# Patient Record
Sex: FEMALE | NOT HISPANIC OR LATINO | URBAN - METROPOLITAN AREA
[De-identification: names, ages, dates, MRNs, and addresses within clinical notes are randomized per-mention and may not be internally consistent; named-entity substitution may affect disease eponyms.]

---

## 2023-02-26 PROBLEM — I48.0 PAROXYSMAL ATRIAL FIBRILLATION (MULTI): Status: ACTIVE | Noted: 2022-02-24

## 2023-02-26 PROBLEM — K59.00 CONSTIPATION: Status: ACTIVE | Noted: 2023-02-26

## 2023-02-26 PROBLEM — E78.00 ELEVATED LOW DENSITY LIPOPROTEIN (LDL) CHOLESTEROL LEVEL: Status: ACTIVE | Noted: 2023-02-26

## 2023-02-26 PROBLEM — I10 BENIGN ESSENTIAL HYPERTENSION: Status: ACTIVE | Noted: 2023-02-26

## 2023-02-26 PROBLEM — S33.5XXA LUMBAR SPRAIN: Status: ACTIVE | Noted: 2023-02-26

## 2023-02-26 PROBLEM — J44.9 CHRONIC OBSTRUCTIVE PULMONARY DISEASE (MULTI): Status: ACTIVE | Noted: 2023-02-26

## 2023-02-26 PROBLEM — R00.2 HEART PALPITATIONS: Status: ACTIVE | Noted: 2023-02-26

## 2023-02-26 PROBLEM — R42 DIZZINESS: Status: ACTIVE | Noted: 2023-02-26

## 2023-02-26 PROBLEM — R35.0 URINARY FREQUENCY: Status: ACTIVE | Noted: 2023-02-26

## 2023-02-26 PROBLEM — F17.210 SMOKING GREATER THAN 20 PACK YEARS: Status: ACTIVE | Noted: 2023-02-26

## 2023-02-26 PROBLEM — G45.9 TIA (TRANSIENT ISCHEMIC ATTACK): Status: ACTIVE | Noted: 2023-02-26

## 2023-02-26 PROBLEM — Z86.010 HISTORY OF COLON POLYPS: Status: ACTIVE | Noted: 2023-02-26

## 2023-02-26 PROBLEM — E03.9 HYPOTHYROIDISM: Status: ACTIVE | Noted: 2022-02-24

## 2023-02-26 PROBLEM — F17.200 NICOTINE DEPENDENCE: Status: ACTIVE | Noted: 2023-02-26

## 2023-02-26 PROBLEM — M79.18 MYOFASCIAL PAIN SYNDROME: Status: ACTIVE | Noted: 2023-02-26

## 2023-02-26 PROBLEM — I99.8 FLUCTUATING BLOOD PRESSURE: Status: ACTIVE | Noted: 2023-02-26

## 2023-02-26 PROBLEM — E55.9 VITAMIN D DEFICIENCY: Status: ACTIVE | Noted: 2023-02-26

## 2023-02-26 PROBLEM — K21.00 GASTRO-ESOPHAGEAL REFLUX DISEASE WITH ESOPHAGITIS: Status: ACTIVE | Noted: 2023-02-26

## 2023-02-26 PROBLEM — Z86.0100 HISTORY OF COLON POLYPS: Status: ACTIVE | Noted: 2023-02-26

## 2023-02-26 PROBLEM — F41.8 ANXIETY ASSOCIATED WITH DEPRESSION: Status: ACTIVE | Noted: 2023-02-26

## 2023-02-26 PROBLEM — G25.81 RESTLESS LEG SYNDROME: Status: ACTIVE | Noted: 2023-02-26

## 2023-02-26 PROBLEM — M25.569 KNEE PAIN: Status: ACTIVE | Noted: 2023-02-26

## 2023-02-26 PROBLEM — I10 UNCONTROLLED HYPERTENSION: Status: ACTIVE | Noted: 2023-02-26

## 2023-02-26 PROBLEM — Z95.2 S/P AVR (AORTIC VALVE REPLACEMENT): Status: ACTIVE | Noted: 2023-02-26

## 2023-02-26 PROBLEM — I35.1 NONRHEUMATIC AORTIC (VALVE) INSUFFICIENCY: Status: ACTIVE | Noted: 2023-02-26

## 2023-02-26 PROBLEM — R09.89 CAROTID ARTERY BRUIT: Status: ACTIVE | Noted: 2023-02-26

## 2023-02-26 PROBLEM — G47.00 INSOMNIA: Status: ACTIVE | Noted: 2023-02-26

## 2023-02-26 RX ORDER — ONDANSETRON 4 MG/1
41 TABLET, ORALLY DISINTEGRATING ORAL EVERY 6 HOURS PRN
COMMUNITY
End: 2023-03-06

## 2023-02-26 RX ORDER — HYDROCHLOROTHIAZIDE 12.5 MG/1
1 TABLET ORAL DAILY
COMMUNITY
Start: 2018-03-21

## 2023-02-26 RX ORDER — ESTRADIOL 0.1 MG/D
1 PATCH TRANSDERMAL
COMMUNITY

## 2023-02-26 RX ORDER — LEVOTHYROXINE SODIUM 75 UG/1
1 TABLET ORAL DAILY
COMMUNITY
Start: 2013-10-08 | End: 2023-03-29 | Stop reason: ALTCHOICE

## 2023-02-26 RX ORDER — ESOMEPRAZOLE MAGNESIUM 40 MG/1
1 CAPSULE, DELAYED RELEASE ORAL DAILY
COMMUNITY
End: 2023-03-06

## 2023-02-26 RX ORDER — AMLODIPINE BESYLATE 5 MG/1
1 TABLET ORAL DAILY
COMMUNITY
Start: 2022-05-04

## 2023-02-26 RX ORDER — ALPRAZOLAM 0.25 MG/1
1 TABLET ORAL DAILY
COMMUNITY
Start: 2013-10-08

## 2023-02-26 RX ORDER — METOPROLOL SUCCINATE 25 MG/1
1 TABLET, EXTENDED RELEASE ORAL DAILY
COMMUNITY
Start: 2018-02-16

## 2023-02-26 RX ORDER — FLUOCINONIDE 0.5 MG/G
CREAM TOPICAL 2 TIMES DAILY
COMMUNITY

## 2023-02-26 RX ORDER — LIDOCAINE 50 MG/G
1 PATCH TOPICAL
COMMUNITY
Start: 2018-02-22 | End: 2023-03-06

## 2023-02-26 RX ORDER — ACETAMINOPHEN 500 MG
1 TABLET ORAL DAILY
COMMUNITY
Start: 2013-10-08

## 2023-02-26 RX ORDER — ASPIRIN 81 MG/1
1 TABLET ORAL DAILY
COMMUNITY

## 2023-02-26 RX ORDER — NICOTINE 7MG/24HR
1 PATCH, TRANSDERMAL 24 HOURS TRANSDERMAL DAILY
COMMUNITY
Start: 2017-10-05 | End: 2023-03-06

## 2023-02-26 RX ORDER — MECLIZINE HYDROCHLORIDE 25 MG/1
.5-1 TABLET ORAL 3 TIMES DAILY PRN
COMMUNITY

## 2023-03-06 ENCOUNTER — LAB (OUTPATIENT)
Dept: LAB | Facility: LAB | Age: 78
End: 2023-03-06
Payer: MEDICARE

## 2023-03-06 ENCOUNTER — OFFICE VISIT (OUTPATIENT)
Dept: PRIMARY CARE | Facility: CLINIC | Age: 78
End: 2023-03-06
Payer: MEDICARE

## 2023-03-06 VITALS
DIASTOLIC BLOOD PRESSURE: 90 MMHG | WEIGHT: 146 LBS | TEMPERATURE: 95.3 F | SYSTOLIC BLOOD PRESSURE: 150 MMHG | BODY MASS INDEX: 27.59 KG/M2

## 2023-03-06 DIAGNOSIS — J44.9 CHRONIC OBSTRUCTIVE PULMONARY DISEASE, UNSPECIFIED COPD TYPE (MULTI): ICD-10-CM

## 2023-03-06 DIAGNOSIS — K64.9 HEMORRHOIDS, UNSPECIFIED HEMORRHOID TYPE: ICD-10-CM

## 2023-03-06 DIAGNOSIS — K59.00 CONSTIPATION, UNSPECIFIED CONSTIPATION TYPE: ICD-10-CM

## 2023-03-06 DIAGNOSIS — R35.0 URINARY FREQUENCY: ICD-10-CM

## 2023-03-06 DIAGNOSIS — R79.9 ABNORMAL FINDING OF BLOOD CHEMISTRY, UNSPECIFIED: ICD-10-CM

## 2023-03-06 DIAGNOSIS — I10 BENIGN ESSENTIAL HYPERTENSION: ICD-10-CM

## 2023-03-06 DIAGNOSIS — Z95.2 S/P AVR (AORTIC VALVE REPLACEMENT): ICD-10-CM

## 2023-03-06 DIAGNOSIS — Z87.891 PERSONAL HISTORY OF NICOTINE DEPENDENCE: ICD-10-CM

## 2023-03-06 DIAGNOSIS — E03.9 HYPOTHYROIDISM, UNSPECIFIED TYPE: ICD-10-CM

## 2023-03-06 DIAGNOSIS — I35.1 NONRHEUMATIC AORTIC (VALVE) INSUFFICIENCY: ICD-10-CM

## 2023-03-06 DIAGNOSIS — G25.81 RESTLESS LEG SYNDROME: Primary | ICD-10-CM

## 2023-03-06 DIAGNOSIS — Z86.010 HISTORY OF COLON POLYPS: ICD-10-CM

## 2023-03-06 DIAGNOSIS — K21.00 GASTROESOPHAGEAL REFLUX DISEASE WITH ESOPHAGITIS, UNSPECIFIED WHETHER HEMORRHAGE: ICD-10-CM

## 2023-03-06 DIAGNOSIS — G45.9 TIA (TRANSIENT ISCHEMIC ATTACK): ICD-10-CM

## 2023-03-06 DIAGNOSIS — E78.00 ELEVATED LOW DENSITY LIPOPROTEIN (LDL) CHOLESTEROL LEVEL: ICD-10-CM

## 2023-03-06 DIAGNOSIS — E55.9 VITAMIN D DEFICIENCY: ICD-10-CM

## 2023-03-06 DIAGNOSIS — F41.8 ANXIETY ASSOCIATED WITH DEPRESSION: ICD-10-CM

## 2023-03-06 DIAGNOSIS — I48.0 PAROXYSMAL ATRIAL FIBRILLATION (MULTI): ICD-10-CM

## 2023-03-06 DIAGNOSIS — R09.89 BILATERAL CAROTID BRUITS: ICD-10-CM

## 2023-03-06 PROBLEM — F17.210 SMOKING GREATER THAN 20 PACK YEARS: Status: RESOLVED | Noted: 2023-02-26 | Resolved: 2023-03-06

## 2023-03-06 PROBLEM — M25.569 KNEE PAIN: Status: RESOLVED | Noted: 2023-02-26 | Resolved: 2023-03-06

## 2023-03-06 PROBLEM — I99.8 FLUCTUATING BLOOD PRESSURE: Status: RESOLVED | Noted: 2023-02-26 | Resolved: 2023-03-06

## 2023-03-06 PROBLEM — R42 DIZZINESS: Status: RESOLVED | Noted: 2023-02-26 | Resolved: 2023-03-06

## 2023-03-06 PROBLEM — S33.5XXA LUMBAR SPRAIN: Status: RESOLVED | Noted: 2023-02-26 | Resolved: 2023-03-06

## 2023-03-06 PROBLEM — F17.200 NICOTINE DEPENDENCE: Status: RESOLVED | Noted: 2023-02-26 | Resolved: 2023-03-06

## 2023-03-06 PROBLEM — G47.00 INSOMNIA: Status: RESOLVED | Noted: 2023-02-26 | Resolved: 2023-03-06

## 2023-03-06 PROBLEM — R00.2 HEART PALPITATIONS: Status: RESOLVED | Noted: 2023-02-26 | Resolved: 2023-03-06

## 2023-03-06 PROBLEM — M79.18 MYOFASCIAL PAIN SYNDROME: Status: RESOLVED | Noted: 2023-02-26 | Resolved: 2023-03-06

## 2023-03-06 LAB
ALANINE AMINOTRANSFERASE (SGPT) (U/L) IN SER/PLAS: 12 U/L (ref 7–45)
ALBUMIN (G/DL) IN SER/PLAS: 4.6 G/DL (ref 3.4–5)
ALKALINE PHOSPHATASE (U/L) IN SER/PLAS: 83 U/L (ref 33–136)
ANION GAP IN SER/PLAS: 12 MMOL/L (ref 10–20)
ASPARTATE AMINOTRANSFERASE (SGOT) (U/L) IN SER/PLAS: 20 U/L (ref 9–39)
BILIRUBIN TOTAL (MG/DL) IN SER/PLAS: 0.6 MG/DL (ref 0–1.2)
CALCIDIOL (25 OH VITAMIN D3) (NG/ML) IN SER/PLAS: 45 NG/ML
CALCIUM (MG/DL) IN SER/PLAS: 10 MG/DL (ref 8.6–10.6)
CARBON DIOXIDE, TOTAL (MMOL/L) IN SER/PLAS: 30 MMOL/L (ref 21–32)
CHLORIDE (MMOL/L) IN SER/PLAS: 106 MMOL/L (ref 98–107)
CREATININE (MG/DL) IN SER/PLAS: 0.98 MG/DL (ref 0.5–1.05)
ESTIMATED AVERAGE GLUCOSE FOR HBA1C: 97 MG/DL
GFR FEMALE: 59 ML/MIN/1.73M2
GLUCOSE (MG/DL) IN SER/PLAS: 102 MG/DL (ref 74–99)
HEMOGLOBIN A1C/HEMOGLOBIN TOTAL IN BLOOD: 5 %
POTASSIUM (MMOL/L) IN SER/PLAS: 5.3 MMOL/L (ref 3.5–5.3)
PROTEIN TOTAL: 6.8 G/DL (ref 6.4–8.2)
SODIUM (MMOL/L) IN SER/PLAS: 143 MMOL/L (ref 136–145)
UREA NITROGEN (MG/DL) IN SER/PLAS: 11 MG/DL (ref 6–23)

## 2023-03-06 PROCEDURE — 3080F DIAST BP >= 90 MM HG: CPT | Performed by: INTERNAL MEDICINE

## 2023-03-06 PROCEDURE — 1160F RVW MEDS BY RX/DR IN RCRD: CPT | Performed by: INTERNAL MEDICINE

## 2023-03-06 PROCEDURE — 83036 HEMOGLOBIN GLYCOSYLATED A1C: CPT

## 2023-03-06 PROCEDURE — 80053 COMPREHEN METABOLIC PANEL: CPT

## 2023-03-06 PROCEDURE — 3077F SYST BP >= 140 MM HG: CPT | Performed by: INTERNAL MEDICINE

## 2023-03-06 PROCEDURE — 82306 VITAMIN D 25 HYDROXY: CPT

## 2023-03-06 PROCEDURE — 36415 COLL VENOUS BLD VENIPUNCTURE: CPT

## 2023-03-06 PROCEDURE — 99214 OFFICE O/P EST MOD 30 MIN: CPT | Performed by: INTERNAL MEDICINE

## 2023-03-06 PROCEDURE — 1159F MED LIST DOCD IN RCRD: CPT | Performed by: INTERNAL MEDICINE

## 2023-03-06 RX ORDER — POLYETHYLENE GLYCOL 3350 17 G/17G
17 POWDER, FOR SOLUTION ORAL DAILY
COMMUNITY

## 2023-03-06 RX ORDER — ACETAMINOPHEN 325 MG/1
325 TABLET ORAL EVERY 6 HOURS PRN
COMMUNITY
End: 2024-03-26 | Stop reason: SDUPTHER

## 2023-03-06 ASSESSMENT — ENCOUNTER SYMPTOMS
PALPITATIONS: 0
APPETITE CHANGE: 0
WEAKNESS: 0
COUGH: 0
ACTIVITY CHANGE: 0
FEVER: 0
FREQUENCY: 0
DIZZINESS: 0
CONSTIPATION: 0
ABDOMINAL PAIN: 0
DIARRHEA: 0
SHORTNESS OF BREATH: 0
FATIGUE: 0

## 2023-03-06 ASSESSMENT — PATIENT HEALTH QUESTIONNAIRE - PHQ9
1. LITTLE INTEREST OR PLEASURE IN DOING THINGS: NOT AT ALL
SUM OF ALL RESPONSES TO PHQ9 QUESTIONS 1 AND 2: 0
2. FEELING DOWN, DEPRESSED OR HOPELESS: NOT AT ALL

## 2023-03-28 ENCOUNTER — APPOINTMENT (OUTPATIENT)
Dept: PRIMARY CARE | Facility: CLINIC | Age: 78
End: 2023-03-28
Payer: MEDICARE

## 2023-03-29 ENCOUNTER — TELEMEDICINE (OUTPATIENT)
Dept: PRIMARY CARE | Facility: CLINIC | Age: 78
End: 2023-03-29
Payer: MEDICARE

## 2023-03-29 DIAGNOSIS — I48.0 PAROXYSMAL ATRIAL FIBRILLATION (MULTI): ICD-10-CM

## 2023-03-29 DIAGNOSIS — Z95.2 S/P AVR (AORTIC VALVE REPLACEMENT): ICD-10-CM

## 2023-03-29 DIAGNOSIS — K76.89 LIVER CYST: Primary | ICD-10-CM

## 2023-03-29 PROCEDURE — 99213 OFFICE O/P EST LOW 20 MIN: CPT | Performed by: INTERNAL MEDICINE

## 2023-03-29 RX ORDER — LEVOTHYROXINE SODIUM 75 UG/1
75 TABLET ORAL
COMMUNITY

## 2023-03-29 NOTE — PROGRESS NOTES
Subjective   Patient ID: Leslie Ceballos is a 77 y.o. female who presents for Follow-up.  HPI    Review of Systems    Objective   Physical Exam  An interactive audio and video telecommunication system which permits real time communications between the patient (at the originating site) and provider (at the distant site) was utilized to provide this telehealth service.    Verbal consent was requested and obtained from the patient on the day of encounter.    Assessment/Plan   Problem List Items Addressed This Visit          Circulatory    Paroxysmal atrial fibrillation (CMS/HCC)    S/P AVR (aortic valve replacement)       Digestive    Liver cyst - Primary    Relevant Orders    Referral to Hepatology   The patient was calling to discuss the results of her recent CT.  Her lung CT didn't show any suspicious nodules, but it reported 4 liver cysts.  She is concerned about it.  I discussed the findings in details, I will put a referral for hepatologist, bt I think cysts are benign and not concerning.         Kassie Schwartz MD

## 2023-06-05 ENCOUNTER — APPOINTMENT (OUTPATIENT)
Dept: PRIMARY CARE | Facility: CLINIC | Age: 78
End: 2023-06-05
Payer: MEDICARE

## 2023-11-06 ENCOUNTER — TELEPHONE (OUTPATIENT)
Dept: PRIMARY CARE | Facility: CLINIC | Age: 78
End: 2023-11-06
Payer: MEDICARE

## 2023-11-06 NOTE — TELEPHONE ENCOUNTER
Pt left a VM stating that she was told by her pharmacist that taking 2 OTC Nexium pills would be the same as taking her prescription Nexium pills that she pays $300 a month for, pt would like to know it that's true and if she can take the OTC instead.

## 2023-11-08 NOTE — TELEPHONE ENCOUNTER
SPOKE WITH PATIENT and made aware of what pharmacist noted. She states she understands and will do so but stated that she doesn't believe me that it will be the same medication. She states she asked pharmacy- kody told her it is not the same. Advised this was sent to our pharm team who confirmed it is equivilant. States again that she is having a hard time believing this and asked or my name- was given to pt who thanked for call. Call was then ended .

## 2024-03-26 ENCOUNTER — OFFICE VISIT (OUTPATIENT)
Dept: PRIMARY CARE | Facility: CLINIC | Age: 79
End: 2024-03-26
Payer: MEDICARE

## 2024-03-26 VITALS
OXYGEN SATURATION: 98 % | SYSTOLIC BLOOD PRESSURE: 160 MMHG | DIASTOLIC BLOOD PRESSURE: 70 MMHG | HEART RATE: 60 BPM | TEMPERATURE: 97.5 F

## 2024-03-26 DIAGNOSIS — K21.00 GASTROESOPHAGEAL REFLUX DISEASE WITH ESOPHAGITIS, UNSPECIFIED WHETHER HEMORRHAGE: ICD-10-CM

## 2024-03-26 DIAGNOSIS — Z00.00 ADULT GENERAL MEDICAL EXAM: Primary | ICD-10-CM

## 2024-03-26 DIAGNOSIS — N95.9 MENOPAUSAL AND POSTMENOPAUSAL DISORDER: ICD-10-CM

## 2024-03-26 DIAGNOSIS — Z76.0 MEDICATION REFILL: ICD-10-CM

## 2024-03-26 DIAGNOSIS — R41.3 SHORT-TERM MEMORY LOSS: ICD-10-CM

## 2024-03-26 DIAGNOSIS — R03.0 ELEVATED BLOOD PRESSURE READING: ICD-10-CM

## 2024-03-26 PROCEDURE — 1036F TOBACCO NON-USER: CPT | Performed by: INTERNAL MEDICINE

## 2024-03-26 PROCEDURE — G0439 PPPS, SUBSEQ VISIT: HCPCS | Performed by: INTERNAL MEDICINE

## 2024-03-26 PROCEDURE — 99215 OFFICE O/P EST HI 40 MIN: CPT | Performed by: INTERNAL MEDICINE

## 2024-03-26 PROCEDURE — 1159F MED LIST DOCD IN RCRD: CPT | Performed by: INTERNAL MEDICINE

## 2024-03-26 PROCEDURE — 1158F ADVNC CARE PLAN TLK DOCD: CPT | Performed by: INTERNAL MEDICINE

## 2024-03-26 PROCEDURE — 1123F ACP DISCUSS/DSCN MKR DOCD: CPT | Performed by: INTERNAL MEDICINE

## 2024-03-26 PROCEDURE — 3078F DIAST BP <80 MM HG: CPT | Performed by: INTERNAL MEDICINE

## 2024-03-26 PROCEDURE — 1126F AMNT PAIN NOTED NONE PRSNT: CPT | Performed by: INTERNAL MEDICINE

## 2024-03-26 PROCEDURE — 3077F SYST BP >= 140 MM HG: CPT | Performed by: INTERNAL MEDICINE

## 2024-03-26 RX ORDER — ACETAMINOPHEN 325 MG/1
325 TABLET ORAL EVERY 6 HOURS PRN
Qty: 90 TABLET | Refills: 3 | Status: SHIPPED | OUTPATIENT
Start: 2024-03-26 | End: 2024-03-29 | Stop reason: SDUPTHER

## 2024-03-26 RX ORDER — ESOMEPRAZOLE MAGNESIUM 40 MG/1
40 CAPSULE, DELAYED RELEASE ORAL
Qty: 30 CAPSULE | Refills: 11 | Status: SHIPPED | OUTPATIENT
Start: 2024-03-26 | End: 2025-03-26

## 2024-03-26 ASSESSMENT — PATIENT HEALTH QUESTIONNAIRE - PHQ9
SUM OF ALL RESPONSES TO PHQ9 QUESTIONS 1 AND 2: 0
2. FEELING DOWN, DEPRESSED OR HOPELESS: NOT AT ALL
1. LITTLE INTEREST OR PLEASURE IN DOING THINGS: NOT AT ALL

## 2024-03-26 ASSESSMENT — PAIN SCALES - GENERAL: PAINLEVEL: 0-NO PAIN

## 2024-03-26 NOTE — PROGRESS NOTES
Outpatient Visit Note    Chief Complaint   Patient presents with    Annual Exam       HPI:  Leslie Ceballos is a 78 y.o. female here  for a Medicare Wellness Visit.    Health Maintenance    Denies smoking or illicit drug use, drinks no alcoholic beverages a week. Patient reports routine vision checks and dental cleanings. Patient has already had routine blood work done.    Screening colonoscopy N/A. Screening pap N/A. Screening mammogram N/A. DEXA never done.       Hearing screen: reports no difficulty with hearing     Does the patient use opioid medications: No    How does the patient rate their health status today:  good     Cognitive Screen:  AAAx3  to person, place and time: Yes  Pt has concern about short term memory loss. She is taking OTC Prevagen        Reviewed:   Past Medical History/Allergies:  Yes  Family History:  Yes  Social History:  Yes  Current Medications:  Yes  Vital Signs:  Yes  Advanced Directives:  discussed  Immunizations:  reviewed today  Home Safety:                    Up & Go test > 30 seconds?  No                   Home have rugs; lack grab bars in bathroom; lack handrail on stairs; have poor lighting?  No                   Hearing difficulties?  No  Geriatric Assessment                   ADL areas requiring assistance:  Does not need help with Dressing, Eating, Ambulating, Toileting, Grooming, Hygiene.                    IADL areas requiring assistance:  Does not need help with Shopping, Housework, Accounting, Transportation, Driving.   Medications: reviewed  Current supplements:  Reviewed and recorded.          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    Other enthesopathies, not elsewhere classified 03/27/2015    Tendinitis of right shoulder    Other shoulder lesions, right shoulder 03/27/2015    Right rotator cuff tendinitis    Pain in right shoulder 08/05/2015    Pain in joint of right shoulder    Pain in right shoulder 09/09/2015    Right shoulder pain    Personal history of other diseases of the digestive system 04/28/2016    History of gastritis    Personal history of other diseases of the musculoskeletal system and connective tissue 08/05/2015    History of low back pain    Personal history of other diseases of the musculoskeletal system and connective tissue 01/01/2014    History of backache    Personal history of other diseases of the respiratory system 12/16/2013    History of acute bronchitis    Personal history of other infectious and parasitic diseases 08/08/2016    History of Helicobacter pylori infection    Personal history of other medical treatment 10/12/2016    History of screening mammography    Personal history of other specified conditions 06/28/2017    History of syncope    Personal history of other specified conditions 10/09/2013    History of abnormal weight loss    Personal history of other specified conditions 12/22/2016    History of wheezing    Personal history of urinary (tract) infections     History of urinary tract infection    Sprain of other specified parts of unspecified shoulder girdle, initial encounter 03/27/2015    Scapulocostal sprain    Sprain of other specified parts of unspecified shoulder girdle, initial encounter 12/17/2014    Scapulocostal sprain    Sprain of unspecified rotator cuff capsule, initial encounter 03/27/2015    Rotator cuff (capsule) sprain    Unspecified sprain of right shoulder  joint, initial encounter 03/27/2015    Sprain of right shoulder        Current Medications  Current Outpatient Medications   Medication Instructions    acetaminophen (TYLENOL) 325 mg, oral, Every 6 hours PRN    ALPRAZolam (Xanax) 0.25 mg tablet 1 tablet, oral, Daily    amLODIPine (Norvasc) 5 mg tablet 1 tablet, oral, Daily    aspirin 81 mg EC tablet 1 tablet, oral, Daily, Takes mon, wed,fridays    cholecalciferol (Vitamin D-3) 50 mcg (2,000 unit) capsule 1 capsule, oral, Daily    estradiol (Climara) 0.1 mg/24 hr patch 1 patch, transdermal, Weekly    fluocinonide 0.05 % cream Topical, 2 times daily, ARIAS AA BID    hydroCHLOROthiazide (HYDRODiuril) 12.5 mg tablet 1 tablet, oral, Daily    meclizine (Antivert) 25 mg tablet 0.5-1 tablets, oral, 3 times daily PRN    metoprolol succinate XL (Toprol-XL) 25 mg 24 hr tablet 1 tablet, oral, Daily    NexIUM 40 mg, oral, Daily before breakfast, Do not open capsule.    polyethylene glycol (GLYCOLAX, MIRALAX) 17 g, oral, Daily    Synthroid 75 mcg, oral, Daily before breakfast        Allergies  Allergies   Allergen Reactions    Atorvastatin Unknown    Codeine Unknown    Iodinated Contrast Media Unknown    Penicillins Unknown    Pravastatin Unknown    Rosuvastatin Unknown    Spironolactone Unknown        Immunizations  Immunization History   Administered Date(s) Administered    Influenza, seasonal, injectable 10/05/2012    Pfizer Gray Cap SARS-CoV-2 05/14/2022    Pfizer Purple Cap SARS-CoV-2 06/08/2021, 06/29/2021, 01/12/2022    Pneumococcal polysaccharide vaccine, 23-valent, age 2 years and older (PNEUMOVAX 23) 06/18/2012    Tdap vaccine, age 7 year and older (BOOSTRIX, ADACEL) 09/04/2008        Past Surgical History:   Procedure Laterality Date    HEMORRHOID SURGERY  10/08/2013    Hemorrhoidectomy    HYSTERECTOMY  10/08/2013    Hysterectomy    OTHER SURGICAL HISTORY  10/08/2013    Liver Surgery    TOTAL THYROIDECTOMY  10/08/2013    Thyroid Surgery Total Thyroidectomy     Family  History   Problem Relation Name Age of Onset    Hypertension Mother      Coronary artery disease Mother      Hypertension Father      Leukemia Father       Social History     Tobacco Use    Smoking status: Former     Types: Cigarettes    Smokeless tobacco: Never   Substance Use Topics    Alcohol use: Not Currently    Drug use: Never     Tobacco Use: Medium Risk (3/26/2024)    Patient History     Smoking Tobacco Use: Former     Smokeless Tobacco Use: Never     Passive Exposure: Not on file        ROS  All pertinent positive symptoms are included in the history of present illness.  All other systems have been reviewed and are negative and noncontributory to this patient's current ailments.    VITAL SIGNS  Vitals:    03/26/24 1403   BP: 160/70   Pulse: 60   Temp: 36.4 °C (97.5 °F)   SpO2: 98%     There were no vitals filed for this visit.   There is no height or weight on file to calculate BMI.     PHYSICAL EXAM  GENERAL APPEARANCE: well nourished, well developed, looks like stated age, in no acute distress, not ill or tired appearing, conversing well.   HEENT: no trauma, normocephalic. PERRLA and EOMI with normal external exam. TM's intact with no injection or effusion, no signs of infection. Nares patent, turbinates pink without discharge. Pharynx pink with no exudates or lesions, no enlarged tonsils.   NECK: no nodes, supple without rigidity, no neck mass was observed, no thyromegaly or thyroid nodules.   HEART: regular rate and rhythm, S1 and S2 heard with no murmurs or skipped beats, no carotid bruits.   LUNGS: clear to auscultation bilaterally with no wheezes, crackles or rales.   ABDOMEN: no organomegaly, soft, nontender, nondistended, normal bowel sounds, no guarding/rebound/rigidity.   EXTREMITIES: moving all extremities equally with no edema or deformities.   SKIN: normal skin color and pigmentation, normal skin turgor without rash, lesions, or nodules visualized.   NEUROLOGIC EXAM: CN II-XII grossly intact,  normal gait, normal balance, 5/5 muscle strength, sensation grossly intact.   PSYCH: mood and affect appropriate; alert and oriented to time, place, person; no difficulty with speech or language.   LYMPH NODES: no cervical lymphadenopathy.           Assessment/Plan   Diagnoses and all orders for this visit:  Adult general medical exam  Gastroesophageal reflux disease with esophagitis, unspecified whether hemorrhage  Comments:  Pt cannot tolerate generic Nexium, as it causes abdominal cramping  Orders:  -     NexIUM 40 mg DR capsule; Take 1 capsule (40 mg) by mouth once daily in the morning. Take before meals. Do not open capsule.  Menopausal and postmenopausal disorder  -     XR DEXA bone density; Future  Medication refill  -     acetaminophen (Tylenol) 325 mg tablet; Take 1 tablet (325 mg) by mouth every 6 hours if needed for mild pain (1 - 3).  Elevated blood pressure reading  Comments:  Pt has white coat syndrome, so BP elevates at appts  Short-term memory loss  Comments:  Pt declines referral to [facility], Dr. Snow. She will call if she reconsiders      This was a shared decision making visit.    Next Wellness Exam Due  In 1 year from today      Juliette Guardado MD   03/26/24   2:46 PM

## 2024-03-29 DIAGNOSIS — Z76.0 MEDICATION REFILL: ICD-10-CM

## 2024-03-29 RX ORDER — ACETAMINOPHEN 325 MG/1
325 TABLET ORAL EVERY 6 HOURS PRN
Qty: 90 TABLET | Refills: 3 | Status: SHIPPED | OUTPATIENT
Start: 2024-03-29

## 2024-03-29 NOTE — TELEPHONE ENCOUNTER
Pt requesting refill of acetaminophen, [facility] pharmacy told her they did not receive it. Rx populated.

## 2024-05-20 ENCOUNTER — HOSPITAL ENCOUNTER (OUTPATIENT)
Dept: RADIOLOGY | Facility: CLINIC | Age: 79
Discharge: HOME | End: 2024-05-20
Payer: MEDICARE

## 2024-05-20 DIAGNOSIS — N95.9 MENOPAUSAL AND POSTMENOPAUSAL DISORDER: ICD-10-CM

## 2024-05-20 PROCEDURE — 77080 DXA BONE DENSITY AXIAL: CPT

## 2024-05-20 PROCEDURE — 77080 DXA BONE DENSITY AXIAL: CPT | Performed by: RADIOLOGY

## 2025-02-04 ENCOUNTER — TELEPHONE (OUTPATIENT)
Dept: PRIMARY CARE | Facility: CLINIC | Age: 80
End: 2025-02-04
Payer: MEDICARE

## 2025-02-04 NOTE — TELEPHONE ENCOUNTER
Pt lvm stating she is calling about her rx for the nexium stating this costing her 331.00 stating they did not get a prior auth pt stating she wants to know what happen to it because the last time the med was 100 dollars pt requesting a call back

## 2025-02-06 NOTE — TELEPHONE ENCOUNTER
[phone] pt stating she need a prior auth faxed over for her nexium so that she would not have to pay 300 dollars for this

## 2025-02-14 ENCOUNTER — TELEPHONE (OUTPATIENT)
Dept: PRIMARY CARE | Facility: CLINIC | Age: 80
End: 2025-02-14
Payer: MEDICARE

## 2025-02-14 NOTE — TELEPHONE ENCOUNTER
Calling regarding appeal for RX Nexium. States a fax was sent yesterday 2/13/2025 with questions regarding appeal. Response can be faxed back to number on the form, or can call Andreia at [facility] at [phone]

## 2025-05-27 ENCOUNTER — OFFICE VISIT (OUTPATIENT)
Dept: PRIMARY CARE | Facility: CLINIC | Age: 80
End: 2025-05-27
Payer: MEDICARE

## 2025-05-27 VITALS
BODY MASS INDEX: 26.07 KG/M2 | TEMPERATURE: 97.5 F | SYSTOLIC BLOOD PRESSURE: 120 MMHG | HEART RATE: 67 BPM | DIASTOLIC BLOOD PRESSURE: 60 MMHG | WEIGHT: 138 LBS | OXYGEN SATURATION: 95 %

## 2025-05-27 DIAGNOSIS — M79.89 LEG SWELLING: ICD-10-CM

## 2025-05-27 DIAGNOSIS — S76.811D STRAIN OF RIGHT ILIOPSOAS MUSCLE, SUBSEQUENT ENCOUNTER: Primary | ICD-10-CM

## 2025-05-27 DIAGNOSIS — I48.0 PAF (PAROXYSMAL ATRIAL FIBRILLATION) (MULTI): ICD-10-CM

## 2025-05-27 DIAGNOSIS — M76.01 GLUTEAL TENDINITIS OF RIGHT BUTTOCK: ICD-10-CM

## 2025-05-27 DIAGNOSIS — M46.1 SACROILIAC INFLAMMATION: ICD-10-CM

## 2025-05-27 PROBLEM — J44.9 CHRONIC OBSTRUCTIVE PULMONARY DISEASE (MULTI): Status: RESOLVED | Noted: 2023-02-26 | Resolved: 2025-05-27

## 2025-05-27 PROCEDURE — G2211 COMPLEX E/M VISIT ADD ON: HCPCS | Performed by: INTERNAL MEDICINE

## 2025-05-27 PROCEDURE — 99214 OFFICE O/P EST MOD 30 MIN: CPT | Performed by: INTERNAL MEDICINE

## 2025-05-27 PROCEDURE — 3074F SYST BP LT 130 MM HG: CPT | Performed by: INTERNAL MEDICINE

## 2025-05-27 PROCEDURE — 1159F MED LIST DOCD IN RCRD: CPT | Performed by: INTERNAL MEDICINE

## 2025-05-27 PROCEDURE — 1126F AMNT PAIN NOTED NONE PRSNT: CPT | Performed by: INTERNAL MEDICINE

## 2025-05-27 PROCEDURE — 3078F DIAST BP <80 MM HG: CPT | Performed by: INTERNAL MEDICINE

## 2025-05-27 PROCEDURE — 1036F TOBACCO NON-USER: CPT | Performed by: INTERNAL MEDICINE

## 2025-05-27 RX ORDER — TRAMADOL HYDROCHLORIDE 50 MG/1
50 TABLET, FILM COATED ORAL EVERY 12 HOURS PRN
COMMUNITY
Start: 2025-05-20 | End: 2025-05-27 | Stop reason: SDUPTHER

## 2025-05-27 RX ORDER — HYDROCHLOROTHIAZIDE 12.5 MG/1
12.5 TABLET ORAL DAILY
Start: 2025-05-27

## 2025-05-27 RX ORDER — METHOCARBAMOL 500 MG/1
500 TABLET, FILM COATED ORAL EVERY 8 HOURS PRN
COMMUNITY
Start: 2025-05-20

## 2025-05-27 RX ORDER — LIDOCAINE 50 MG/G
1 PATCH TOPICAL DAILY
Qty: 30 PATCH | Refills: 3 | Status: SHIPPED | OUTPATIENT
Start: 2025-05-27

## 2025-05-27 RX ORDER — TRAMADOL HYDROCHLORIDE 50 MG/1
50 TABLET, FILM COATED ORAL EVERY 12 HOURS PRN
Qty: 10 TABLET | Refills: 0 | Status: SHIPPED | OUTPATIENT
Start: 2025-05-27

## 2025-05-27 ASSESSMENT — PATIENT HEALTH QUESTIONNAIRE - PHQ9
1. LITTLE INTEREST OR PLEASURE IN DOING THINGS: NOT AT ALL
2. FEELING DOWN, DEPRESSED OR HOPELESS: NOT AT ALL
SUM OF ALL RESPONSES TO PHQ9 QUESTIONS 1 AND 2: 0

## 2025-05-27 ASSESSMENT — ENCOUNTER SYMPTOMS
WEAKNESS: 0
NUMBNESS: 0
DIFFICULTY URINATING: 0
HEMATURIA: 0
APPETITE CHANGE: 0
DYSURIA: 0
CHILLS: 0
SHORTNESS OF BREATH: 0
BACK PAIN: 1
ACTIVITY CHANGE: 1
FEVER: 0

## 2025-05-27 ASSESSMENT — PAIN SCALES - GENERAL: PAINLEVEL_OUTOF10: 0-NO PAIN

## 2025-05-27 NOTE — PROGRESS NOTES
Subjective   Patient ID: Leslie Ceballos is a 79 y.o. female who presents for Hospital Follow-up.    This is a 79 y.o. who was hospitalized at [facility] 5/16-5/20 for acute right low back, hip and groin pain. She denies any mechanism of injury. She is currently taking Tramadol, Methocarbamol and Lidocaine patches for her pain. She had an MRI under general anesthesia while hospitalized and it showed:  Mild lumbar spondylosis without high-grade canal or foraminal narrowing.  No evidence of an acute osseous abnormality.   Findings suggesting mild distal right right iliopsoas muscular strain.  Lesser edema involving the right piriformis muscle medial aspect, may relate to mild muscular strain.   Pt was discharged with home  physical therapy, but because her PCP is not with [facility], it was stopped. Pt is still having pain and needs a new physical therapy referral.           Review of Systems   Constitutional:  Positive for activity change. Negative for appetite change, chills and fever.   Respiratory:  Negative for shortness of breath.    Cardiovascular:  Negative for chest pain.   Genitourinary:  Negative for difficulty urinating, dysuria, enuresis and hematuria.   Musculoskeletal:  Positive for back pain.        See HPI   Neurological:  Negative for weakness and numbness.       Objective   /60   Pulse 67   Temp 36.4 °C (97.5 °F)   Wt 62.6 kg (138 lb)   SpO2 95%   BMI 26.07 kg/m²     Physical Exam  Vitals reviewed.   Constitutional:       General: She is not in acute distress.     Appearance: She is not ill-appearing.   Cardiovascular:      Rate and Rhythm: Normal rate and regular rhythm.      Heart sounds: Normal heart sounds.   Pulmonary:      Effort: Pulmonary effort is normal.      Breath sounds: Normal breath sounds.   Musculoskeletal:      Lumbar back: No bony tenderness. Decreased range of motion.      Comments: Tenderness in right gluteus   Neurological:      General: No focal deficit present.       Mental Status: She is alert and oriented to person, place, and time.   Psychiatric:         Mood and Affect: Mood normal.         Assessment/Plan   Diagnoses and all orders for this visit:  Strain of right iliopsoas muscle, subsequent encounter  Comments:  Hospital records reviewed  Orders:  -     Referral to Physical Therapy; Future  -     traMADol (Ultram) 50 mg tablet; Take 1 tablet (50 mg) by mouth every 12 hours if needed for moderate pain (4 - 6) or severe pain (7 - 10).  Leg swelling  -     hydroCHLOROthiazide (Microzide) 12.5 mg tablet; Take 1 tablet (12.5 mg) by mouth once daily.  Sacroiliac inflammation  -     Referral to Physical Therapy; Future  -     traMADol (Ultram) 50 mg tablet; Take 1 tablet (50 mg) by mouth every 12 hours if needed for moderate pain (4 - 6) or severe pain (7 - 10).  Gluteal tendinitis of right buttock  -     Referral to Physical Therapy; Future  -     traMADol (Ultram) 50 mg tablet; Take 1 tablet (50 mg) by mouth every 12 hours if needed for moderate pain (4 - 6) or severe pain (7 - 10).  -     lidocaine (Lidoderm) 5 % patch; Place 1 patch over 12 hours on the skin once daily. Apply to painful area 12 hours per day, remove for 12 hours.  PAF (paroxysmal atrial fibrillation) (Multi)  Comments:  Stable on Metoprolol  Other orders  -     Follow Up In Primary Care - Medicare Annual; Future

## 2025-06-10 ENCOUNTER — EVALUATION (OUTPATIENT)
Dept: PHYSICAL THERAPY | Facility: CLINIC | Age: 80
End: 2025-06-10
Payer: MEDICARE

## 2025-06-10 DIAGNOSIS — M46.1 SACROILIAC INFLAMMATION: ICD-10-CM

## 2025-06-10 DIAGNOSIS — S76.811D STRAIN OF ILIOPSOAS MUSCLE, RIGHT, SUBSEQUENT ENCOUNTER: Primary | ICD-10-CM

## 2025-06-10 DIAGNOSIS — S76.811D STRAIN OF RIGHT ILIOPSOAS MUSCLE, SUBSEQUENT ENCOUNTER: ICD-10-CM

## 2025-06-10 DIAGNOSIS — M76.01 GLUTEAL TENDINITIS OF RIGHT BUTTOCK: ICD-10-CM

## 2025-06-10 PROCEDURE — 97014 ELECTRIC STIMULATION THERAPY: CPT | Mod: GP | Performed by: PHYSICAL THERAPIST

## 2025-06-10 PROCEDURE — 97161 PT EVAL LOW COMPLEX 20 MIN: CPT | Mod: GP | Performed by: PHYSICAL THERAPIST

## 2025-06-10 ASSESSMENT — ENCOUNTER SYMPTOMS
OCCASIONAL FEELINGS OF UNSTEADINESS: 0
LOSS OF SENSATION IN FEET: 0
DEPRESSION: 0

## 2025-06-10 NOTE — PROGRESS NOTES
Physical Therapy Evaluation    Patient Name: Leslie Ceballos  MRN: [id number]  Today's Date: 6/10/2025  Visit: 1  Referred by: Dr. Juliette Guardado   Time in:     3:30           Time out: 4:20  Diagnosis:   1. Strain of iliopsoas muscle, right, subsequent encounter        2. Gluteal tendinitis of right buttock        3. Sacroiliac inflammation        PRECAUTIONS:   none    SUBJECTIVE:  79 y.o. english speaking female with c/o LBP and R) hip and groin pain.  Onset was 5-9-25 woke up with severe pain and couldn't put weight on the LE..  She was hospitalized for this 5/16-20/25.  Pain has improved since started on pain meds (Tramadol).    Worse: prolonged standing and walking, AM, prolonged sitting  Better: lying down, sitting,   Pertinent negatives: (-) NT, NP, BB, weakness   (?) CS  MRI: lumbar spondylosis without canal stenosis or foraminal narrowning.  R) iliopsoas muscle strain and R) piriformis strain.    Pain:  B- 3/10,  W- 7-8/10  Home Living:   and lives in their home.  Prior level of function:  No limitations.  Personal factors that may impact care:  none  OBJECTIVE:  R) LBP with R) SLR to 60*  (+) pain and limitation with R) GEN  TTP along R) SIJ, PSIS and in R) lower lumbar paraspinals.  TTP over R) Greater trochanter and piriformis.  Pain with p/a to sacrum and L5> L4  Mod limitation of trunk movements, worse with end ranges and worse with reps.    Outcome Measure:  LEFS- 27/80= 34%    ASSESSMENT:  Moderated complexity presentation and exam.  Symptoms very irritable today and easily increased with movements. Unable to classify pain syndrome today.  She requires PT to attempt to improve  pain levels to allow more detailed exam and progress with improving functional tolerances.    Problem list:  see above     Clinical presentation:  Stable and/or uncomplicated characteristics,     TREATMENT:  - Therex:  Hi-frequency IFC E-stim and cold pack/heat (7.5 mins ea) x 15 min. To R) lower lumbar and  SIJ     PATIENT EDUCATION:  HEP    PLAN:   Daily heat/cold contrasting and continue meds.  Try Voltaren gel over LB and SIJ.   PT 1-2x/week  x 8 weeks to progress towards PT goals.  Rehab potential: fair to good  Plan of care agreement: Y    GOALS:  Active       Pt will have improved pain free mobility       Pt will have improved pain free mobility       Start:  06/10/25    Expected End:  08/22/25            Pt will have improved core muscle strength       Start:  06/10/25    Expected End:  08/22/25            Pt will have improved Oswestry score by at least 10%       Start:  06/10/25    Expected End:  08/22/25            Pt will have decreased pain reports by at least 2 levels       Start:  06/10/25    Expected End:  07/25/25            Pt will be independent with HEP       Start:  06/10/25    Expected End:  07/25/25

## 2025-06-11 ENCOUNTER — TELEPHONE (OUTPATIENT)
Dept: PRIMARY CARE | Facility: CLINIC | Age: 80
End: 2025-06-11
Payer: MEDICARE

## 2025-06-11 DIAGNOSIS — M46.1 SACROILIAC INFLAMMATION: ICD-10-CM

## 2025-06-11 DIAGNOSIS — M76.01 GLUTEAL TENDINITIS OF RIGHT BUTTOCK: Primary | ICD-10-CM

## 2025-06-11 DIAGNOSIS — S76.811D STRAIN OF RIGHT ILIOPSOAS MUSCLE, SUBSEQUENT ENCOUNTER: ICD-10-CM

## 2025-06-11 NOTE — TELEPHONE ENCOUNTER
Pt lvm stating she was seen on 06/10 for PT. Pt states she feels worse than what she did before she went. Pt would like a call to discuss other options. Please advise,

## 2025-06-13 RX ORDER — TRAMADOL HYDROCHLORIDE 50 MG/1
50 TABLET, FILM COATED ORAL EVERY 12 HOURS PRN
Qty: 10 TABLET | Refills: 0 | Status: SHIPPED | OUTPATIENT
Start: 2025-06-13

## 2025-06-13 NOTE — TELEPHONE ENCOUNTER
Pt notified of referral and scheduling phone number. Pt is asking if PCP can prescribe something for the neck pain in the meantime. States the pain is getting worse in her neck since her PT visit. Please advise.

## 2025-06-13 NOTE — TELEPHONE ENCOUNTER
Rx sent. I have personally reviewed the OARRS report for Leslie Toney. I have considered the risks of abuse, dependence, addiction and diversion.”

## 2025-06-13 NOTE — TELEPHONE ENCOUNTER
Called her and yes she would, she has been doing ice, heat and her cream, nothing is helping and she would appreciate the tramadol.

## 2025-06-16 ENCOUNTER — APPOINTMENT (OUTPATIENT)
Dept: PHYSICAL THERAPY | Facility: CLINIC | Age: 80
End: 2025-06-16
Payer: MEDICARE

## 2025-06-18 ENCOUNTER — APPOINTMENT (OUTPATIENT)
Dept: PHYSICAL THERAPY | Facility: CLINIC | Age: 80
End: 2025-06-18
Payer: MEDICARE

## 2025-06-23 ENCOUNTER — TELEPHONE (OUTPATIENT)
Dept: PRIMARY CARE | Facility: CLINIC | Age: 80
End: 2025-06-23
Payer: MEDICARE

## 2025-06-23 DIAGNOSIS — Z76.0 MEDICATION REFILL: ICD-10-CM

## 2025-06-23 DIAGNOSIS — M76.01 GLUTEAL TENDINITIS OF RIGHT BUTTOCK: Primary | ICD-10-CM

## 2025-06-23 NOTE — TELEPHONE ENCOUNTER
Pt June 30th medical wellness visit has been rescheduled to 8/15/ at 11:30 am    Pt has been schedule for 6/30 to discuss some issues with Dr. Guardado for a virtual call visit    Pt asking if an rx for tylenol could be called in stating the rx she has is out dated pt stating she has been having pain in     her hip she does not want to continue to take the tramadol

## 2025-06-23 NOTE — TELEPHONE ENCOUNTER
She is scheduled for a Medicare Wellness Visit, generally these are preferred to be done in person so that a physical exam can be perfomed as well.  Would recommend rescheduling MWV if unable to come in person, could schedule a televisit for other specific concerns on existing day/time.

## 2025-06-23 NOTE — TELEPHONE ENCOUNTER
Pt lvm asking if she could change her appt on 6/30/25 to a virtual appt. Stating her  will be having a total shoulder replacement on 6/26 he will not be available to bring her to the appt and she will need to be available to help him. Pt stating she does not want to cancel the appt she would still like to speak with dr. Guardado     Please advise

## 2025-06-24 RX ORDER — ACETAMINOPHEN 325 MG/1
325 TABLET ORAL EVERY 6 HOURS PRN
Qty: 90 TABLET | Refills: 0 | Status: SHIPPED | OUTPATIENT
Start: 2025-06-24

## 2025-06-30 ENCOUNTER — TELEMEDICINE (OUTPATIENT)
Dept: PRIMARY CARE | Facility: CLINIC | Age: 80
End: 2025-06-30
Payer: MEDICARE

## 2025-06-30 ENCOUNTER — APPOINTMENT (OUTPATIENT)
Dept: PRIMARY CARE | Facility: CLINIC | Age: 80
End: 2025-06-30
Payer: MEDICARE

## 2025-06-30 DIAGNOSIS — I10 BENIGN ESSENTIAL HYPERTENSION: Primary | ICD-10-CM

## 2025-06-30 PROCEDURE — 1159F MED LIST DOCD IN RCRD: CPT | Performed by: INTERNAL MEDICINE

## 2025-06-30 PROCEDURE — 99213 OFFICE O/P EST LOW 20 MIN: CPT | Performed by: INTERNAL MEDICINE

## 2025-06-30 NOTE — PROGRESS NOTES
Subjective   Patient ID: Leslie Ceballos is a 79 y.o. female who presents for No chief complaint on file..    This is a 79 y.o. presenting with concerns regarding her elevated blood pressure. She states that her SBP has been in the 140's-150's. She has been taking Metoprolol succinate nightly and was advised to take Metoprolol tartrate as needed, in addition, by her Cardiologist. When she was taking Amlodipine, her BP was well controlled. The Amlodipine  was changed and the medication started making her dizzy. Pt's HR ranges from 50's-60's.         Review of Systems   Constitutional:  Negative for activity change, chills and fever.   Respiratory:  Negative for shortness of breath.    Cardiovascular:  Negative for chest pain and palpitations.   Neurological:  Negative for dizziness and headaches.       Objective   There were no vitals taken for this visit.    Physical Exam  Constitutional:       General: She is not in acute distress.  Pulmonary:      Effort: Pulmonary effort is normal.   Neurological:      Mental Status: She is alert and oriented to person, place, and time.   Psychiatric:         Mood and Affect: Mood normal.         Assessment/Plan   Diagnoses and all orders for this visit:  Benign essential hypertension  Continue Metoprolol succinate. Pt will call other pharmacies and see if they carry a different  of Amlodipine.    Virtual or Telephone Consent    While technically available, the patient was unable or unwilling to consent to connect via audio/video telehealth technology; therefore, I performed this visit using a real-time audio only connection between Leslie KEL Toney Ceballos & Juliette Guardado MD.  [id number] consent was requested and obtained from Leslie Ceballos on this date, 07/01/25 for a telehealth visit and the patient's location was confirmed at the time of the visit.

## 2025-07-01 ASSESSMENT — ENCOUNTER SYMPTOMS
PALPITATIONS: 0
HEADACHES: 0
DIZZINESS: 0
ACTIVITY CHANGE: 0
CHILLS: 0
FEVER: 0
SHORTNESS OF BREATH: 0

## 2025-07-07 ENCOUNTER — APPOINTMENT (OUTPATIENT)
Dept: PHYSICAL THERAPY | Facility: CLINIC | Age: 80
End: 2025-07-07
Payer: MEDICARE

## 2025-07-08 ENCOUNTER — APPOINTMENT (OUTPATIENT)
Dept: PAIN MEDICINE | Facility: HOSPITAL | Age: 80
End: 2025-07-08
Payer: MEDICARE

## 2025-07-10 ENCOUNTER — APPOINTMENT (OUTPATIENT)
Dept: PHYSICAL THERAPY | Facility: CLINIC | Age: 80
End: 2025-07-10
Payer: MEDICARE

## 2025-07-24 ENCOUNTER — HOSPITAL ENCOUNTER (OUTPATIENT)
Dept: RADIOLOGY | Facility: HOSPITAL | Age: 80
Discharge: HOME | End: 2025-07-24
Payer: MEDICARE

## 2025-07-24 ENCOUNTER — APPOINTMENT (OUTPATIENT)
Dept: PAIN MEDICINE | Facility: HOSPITAL | Age: 80
End: 2025-07-24
Payer: MEDICARE

## 2025-07-24 DIAGNOSIS — G95.9 CERVICAL MYELOPATHY: Primary | ICD-10-CM

## 2025-07-24 DIAGNOSIS — M54.2 NECK PAIN: ICD-10-CM

## 2025-07-24 PROCEDURE — 99214 OFFICE O/P EST MOD 30 MIN: CPT | Performed by: ANESTHESIOLOGY

## 2025-07-24 PROCEDURE — 99204 OFFICE O/P NEW MOD 45 MIN: CPT | Performed by: ANESTHESIOLOGY

## 2025-07-24 PROCEDURE — 1125F AMNT PAIN NOTED PAIN PRSNT: CPT | Performed by: ANESTHESIOLOGY

## 2025-07-24 PROCEDURE — 72050 X-RAY EXAM NECK SPINE 4/5VWS: CPT

## 2025-07-24 PROCEDURE — 1159F MED LIST DOCD IN RCRD: CPT | Performed by: ANESTHESIOLOGY

## 2025-07-24 ASSESSMENT — PAIN SCALES - GENERAL: PAINLEVEL_OUTOF10: 7

## 2025-07-24 NOTE — PROGRESS NOTES
Chief Complaint   Patient presents with    Back Pain    Hip Pain        HPI     Patient medication list includes Tylenol, lidocaine patch, methocarbamol, tramadol.    ROS: 13 point review of systems is complete and is negative listed above in HPI    Medical History[1]    Surgical History[2]    Family History[3]    Social History[4]    Medications Ordered Prior to Encounter[5]     RX Allergies[6]       Imaging:  IMPRESSION:    Mild lumbar spondylosis without high-grade canal or foraminal narrowing.  Small amount of edema adjacent to the right L4-5 facet, likely mechanical  stress.        Anatomic Lumbar Variant: None.  L4-5 is considered the level of the iliac   crest and assume there are 5 lumbar-type vertebrae.                    Transcribed Using Voice Recognition  Transcribe Date/Time: May 20 2025  1:54P    Dictated by: TONIA KING MD    This examination was interpreted and the report reviewed and  electronically signed by:  TONIA KING MD on May 20 2025  1:57PM  EST  Narrative    * * *Final Report* * *    DATE OF EXAM: May 20 2025  1:43PM      [facility]   0303  -  MRI LUMBAR SPINE WO IVCON  / ACCESSION #  [id number]    PROCEDURE REASON: Spine fracture, lumbar, pathological        * * * * Physician Interpretation * * * *    RESULT: EXAMINATION:  MRI LUMBAR SPINE WO IVCON    CLINICAL HISTORY:  Spine fracture, lumbar, pathological      TECHNIQUE: Routine lumbosacral spine MR protocol without gadolinium.    MQ:  [id number]    COMPARISON: CT abdomen pelvis 10/22/2024.      RESULT:      Counting reference:  Lumbosacral junction.  For the purposes of this  report,  L4-5 is considered the level of the iliac crest and assume there  are 5 lumbar-type vertebrae.  Anatomic variant:  None.    Localizer images:  Small T2 hyperintense lesions noted in the bilateral  kidneys and liver on the  images which are incompletely evaluated,  presumably cysts.    Alignment:    Alignment is anatomic.    Bone marrow signal/fracture:  No  evidence of pathologic marrow  infiltration.  No evidence of prior fracture.    Conus:  The conus is within normal limits of signal intensity and  morphology.    Paraspinal soft tissues:   Small amount of edema adjacent to the right  L4-5 facet, likely mechanical stress.    T11-T12 and T12-L1:  Visualized lower thoracic canal and foramina are  patent.    L1-L2:    Canal and foramina are patent.    L2-L3:    Canal and foramina are patent.  Disc degeneration with mild  diffuse disc bulge.    L3-L4:    Canal and foramina are patent.  Disc degeneration with mild  diffuse disc bulge.    L4-L5:    Canal and foramina are patent.  Mild facet arthropathy.    L5-S1:    Canal and foramina are patent    Sacrum and iliac wings:   See concurrent MRI of the pelvis  IMPRESSION:  1.  No evidence of an acute osseous abnormality.  2.  Findings suggesting mild distal right right iliopsoas muscular  strain.  Lesser edema involving the right piriformis muscle medial  aspect, may relate to mild muscular strain.  3.  Chronic and degenerative changes, as described.        Transcribed Using Voice Recognition  Transcribe Date/Time: May 20 2025  1:56P    Dictated by: LAURA MEDEIROS DO    This examination was interpreted and the report reviewed and  electronically signed by:  LAURA MEDEIROS DO on May 20 2025  2:11PM  EST  Narrative    * * *Final Report* * *    DATE OF EXAM: May 20 2025  1:41PM      [facility]   0741  -  MRI PELVIS WO IVCON  / ACCESSION #  [id number]    PROCEDURE REASON: Pelvic trauma        * * * * Physician Interpretation * * * *    RESULT: EXAMINATION:  MRI PELVIS WO IVCON    CLINICAL INFORMATION:  Pelvic trauma    COMPARISON: 05/10/2025 CT pelvis    TECHNIQUE: Multiplanar musculoskeletal protocol MRI of the pelvis without  contrast    RESULT:    No evidence of an acute fracture, dislocation, suspicious marrow  replacing lesion, or osteonecrosis.  Bilateral hips appear relatively  maintained (note that articular cartilage and  acetabular driss is not  well assessed on large field-of-view imaging).  Mild bilateral sacroiliac  and pubic symphysis degenerative changes.    Nonspecific feathery muscular edema along the right iliopsoas  distribution (see images 5:6-21 for example).  There appears to be lesser  muscular edema involving the right piriformis muscle medial aspect.    Moderate to severe left piriformis muscular atrophy.  Remainder of the  imaged muscle bulk relatively normal bulk and signal intensity.    Chronic left gluteus minimus insertional partial tearing with background  tendinosis.  Mild bilateral gluteus medius and right gluteus minimus  tendinosis.  Remainder of the imaged tendons appear grossly intact.    Imaged visceral abdomen and pelvis is not optimized for assessment on  this musculoskeletal protocol MRI, but is without evidence of an  unexpected acute or aggressive abnormality.  Uterus is absent.  Please  see concurrently performed and separately dictated lumbar spine MRI for  lumbar spine findings.    Physical Exam:  Gen.: Patient appears to be stated age, fair hygiene  Eyes: Pupils are symmetric, conjunctiva is nonicteric and lids without obvious drooping or rash  ENT: Hearing is grossly intact, external ears and nose appear to be without deformity or rash. No lesions or masses noted.  Neck: No JVD noted, tracheal position is midline  Respiratory: No gasping or shortness of breath noted, no use of accessory muscles noted  Cardiovascular: Extremity show no edema or varicosities  Lymph: No lymphadenopathy noted in the anterior cervical regions bilaterally  Skin no rashes or open lesions or ulcers identified on the skin  Musculoskeletal: Gait is grossly normal  Neurologic: Cranial nerves II through XII are grossly intact  Psychiatric:  Patient is alert and oriented x3    Impression/Plan:           [1]   Past Medical History:  Diagnosis Date    Allergic     Anxiety     Arthritis ?    Asymptomatic menopausal state  08/05/2015    Menopause    Cataract 2024    Chronic kidney disease     Chronic pain disorder May 2025    Clotting disorder (Multi) ?    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 Prior to 2005    Joint pain May 2025    Low back pain May 2025    Migraine Occasionally and from oxcycoden    Neck pain April 2025    Nontoxic multinodular goiter 09/01/2014    Multinodular goiter    Other chest pain 10/10/2016    Chest pressure    Other conditions influencing health status 12/11/2013    Arthropathy Of The Knee / Patella / Tibia / Fibula    Other enthesopathies, not elsewhere classified 03/27/2015    Tendinitis of right shoulder    Other shoulder lesions, right shoulder 03/27/2015    Right rotator cuff tendinitis    Pain in right shoulder 08/05/2015    Pain in joint of right shoulder    Pain in right shoulder 09/09/2015    Right shoulder pain    Personal history of other diseases of the digestive system 04/28/2016    History of gastritis    Personal history of other diseases of the musculoskeletal system and connective tissue 08/05/2015    History of low back pain    Personal history of other diseases of the musculoskeletal system and connective tissue 01/01/2014    History of backache    Personal history of other diseases of the respiratory system 12/16/2013    History of acute bronchitis    Personal history of other infectious and parasitic diseases 08/08/2016    History of Helicobacter pylori infection    Personal history of other medical treatment 10/12/2016    History of screening mammography    Personal history of other specified  conditions 06/28/2017    History of syncope    Personal history of other specified conditions 10/09/2013    History of abnormal weight loss    Personal history of other specified conditions 12/22/2016    History of wheezing    Personal history of urinary (tract) infections     History of urinary tract infection    Sprain of other specified parts of unspecified shoulder girdle, initial encounter 03/27/2015    Scapulocostal sprain    Sprain of other specified parts of unspecified shoulder girdle, initial encounter 12/17/2014    Scapulocostal sprain    Sprain of unspecified rotator cuff capsule, initial encounter 03/27/2015    Rotator cuff (capsule) sprain    Unspecified sprain of right shoulder joint, initial encounter 03/27/2015    Sprain of right shoulder    Urinary tract infection     Varicella 5 years old    Visual impairment ?   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 30’s    WISDOM TOOTH EXTRACTION     [3]   Family History  Problem Relation Name Age of Onset    Hypertension Mother      Coronary artery disease Mother      Hypertension Father      Leukemia Father      Stroke Mother Anastasiya Toney     Arthritis Mother Anastasiya Toney     Hearing loss Mother Anastasiya Toney     Cancer Father Terrence Toney Sr.     Vision loss Father Terrence Toney Sr.     Asthma Paternal Grandmother Lesley Ga     COPD Brother Terrence Toney Jr.     Asthma Paternal Grandmother Lesley Ga     COPD Brother Terrence  Dawna Jr.     Osteoporosis Mother Anastasiya Toney 64    Alcohol abuse Father Terrence Toney Sr. 12 - 14   [4]   Social History  Tobacco Use    Smoking status: Former     Current packs/day: 0.00     Average packs/day: 1 pack/day for 90.0 years (90.0 ttl pk-yrs)     Types: Cigarettes     Start date: 10/25/1957     Quit date: 2017     Years since quittin.7    Smokeless tobacco: Never    Tobacco comments:     started smoking at the age of 12. i or 2 a day until age 16 when I started smoking about 10   Vaping Use    Vaping status: Never Used   Substance Use Topics    Alcohol use: Not Currently    Drug use: Never   [5]   Current Outpatient Medications on File Prior to Visit   Medication Sig Dispense Refill    acetaminophen (Tylenol) 325 mg tablet Take 1 tablet (325 mg) by mouth every 6 hours if needed for mild pain (1 - 3). 90 tablet 0    ALPRAZolam (Xanax) 0.25 mg tablet Take 1 tablet (0.25 mg) by mouth once daily.      aspirin 81 mg EC tablet Take 1 tablet (81 mg) by mouth once daily. Takes mon, wed,      cholecalciferol (Vitamin D-3) 50 mcg (2,000 unit) capsule Take 1 capsule (50 mcg) by mouth once daily.      estradiol (Climara) 0.1 mg/24 hr patch Place 1 patch on the skin 1 (one) time per week.      fluocinonide 0.05 % cream Apply topically 2 times a day. ARIAS AA BID      lidocaine (Lidoderm) 5 % patch Place 1 patch over 12 hours on the skin once daily. Apply to painful area 12 hours per day, remove for 12 hours. 30 patch 3    meclizine (Antivert) 25 mg tablet Take 0.5-1 tablets (12.5-25 mg) by mouth 3 times a day as needed for dizziness (vertiginous sensation).      methocarbamol (Robaxin) 500 mg tablet Take 1 tablet (500 mg) by mouth every 8 hours if needed.      metoprolol succinate XL (Toprol-XL) 25 mg 24 hr tablet Take 1 tablet (25 mg) by mouth once daily.      NexIUM 40 mg DR capsule Take 1 capsule (40 mg) by mouth once daily in the morning. Take before meals. Do not open capsule. 30  capsule 11    polyethylene glycol (Glycolax) 17 gram packet Take 17 g by mouth once daily.      Synthroid 75 mcg tablet Take 1 tablet (75 mcg) by mouth once daily in the morning. Take before meals.      traMADol (Ultram) 50 mg tablet Take 1 tablet (50 mg) by mouth every 12 hours if needed for moderate pain (4 - 6) or severe pain (7 - 10). 10 tablet 0     No current facility-administered medications on file prior to visit.   [6]   Allergies  Allergen Reactions    Amlodipine Other    Atorvastatin Unknown    Codeine Unknown    Iodinated Contrast Media Unknown    Penicillins Unknown    Pravastatin Unknown    Rosuvastatin Unknown    Spironolactone Unknown      Take 1 tablet (25 mg) by mouth once daily.      NexIUM 40 mg DR capsule Take 1 capsule (40 mg) by mouth once daily in the morning. Take before meals. Do not open capsule. 30 capsule 11    polyethylene glycol (Glycolax) 17 gram packet Take 17 g by mouth once daily.      Synthroid 75 mcg tablet Take 1 tablet (75 mcg) by mouth once daily in the morning. Take before meals.      traMADol (Ultram) 50 mg tablet Take 1 tablet (50 mg) by mouth every 12 hours if needed for moderate pain (4 - 6) or severe pain (7 - 10). 10 tablet 0     No current facility-administered medications on file prior to visit.   [6]   Allergies  Allergen Reactions    Amlodipine Other    Atorvastatin Unknown    Codeine Unknown    Iodinated Contrast Media Unknown    Penicillins Unknown    Pravastatin Unknown    Rosuvastatin Unknown    Spironolactone Unknown

## 2025-08-05 DIAGNOSIS — M54.12 CERVICAL RADICULITIS: Primary | ICD-10-CM

## 2025-08-07 DIAGNOSIS — M54.12 CERVICAL RADICULOPATHY: ICD-10-CM

## 2025-08-12 ENCOUNTER — TELEPHONE (OUTPATIENT)
Dept: PRIMARY CARE | Facility: CLINIC | Age: 80
End: 2025-08-12
Payer: MEDICARE

## 2025-08-15 ENCOUNTER — APPOINTMENT (OUTPATIENT)
Dept: PRIMARY CARE | Facility: CLINIC | Age: 80
End: 2025-08-15
Payer: MEDICARE

## 2025-09-22 ENCOUNTER — APPOINTMENT (OUTPATIENT)
Dept: PRIMARY CARE | Facility: CLINIC | Age: 80
End: 2025-09-22
Payer: MEDICARE